# Patient Record
Sex: FEMALE | Race: WHITE | NOT HISPANIC OR LATINO
[De-identification: names, ages, dates, MRNs, and addresses within clinical notes are randomized per-mention and may not be internally consistent; named-entity substitution may affect disease eponyms.]

---

## 2021-09-24 PROBLEM — Z00.00 ENCOUNTER FOR PREVENTIVE HEALTH EXAMINATION: Status: ACTIVE | Noted: 2021-09-24

## 2021-09-27 ENCOUNTER — NON-APPOINTMENT (OUTPATIENT)
Age: 26
End: 2021-09-27

## 2021-09-30 ENCOUNTER — NON-APPOINTMENT (OUTPATIENT)
Age: 26
End: 2021-09-30

## 2021-09-30 ENCOUNTER — APPOINTMENT (OUTPATIENT)
Dept: HEART AND VASCULAR | Facility: CLINIC | Age: 26
End: 2021-09-30
Payer: COMMERCIAL

## 2021-09-30 VITALS
SYSTOLIC BLOOD PRESSURE: 118 MMHG | WEIGHT: 257 LBS | HEIGHT: 64 IN | HEART RATE: 99 BPM | BODY MASS INDEX: 43.87 KG/M2 | DIASTOLIC BLOOD PRESSURE: 70 MMHG

## 2021-09-30 VITALS — SYSTOLIC BLOOD PRESSURE: 118 MMHG | DIASTOLIC BLOOD PRESSURE: 70 MMHG

## 2021-09-30 DIAGNOSIS — E66.9 OBESITY, UNSPECIFIED: ICD-10-CM

## 2021-09-30 DIAGNOSIS — Z82.49 FAMILY HISTORY OF ISCHEMIC HEART DISEASE AND OTHER DISEASES OF THE CIRCULATORY SYSTEM: ICD-10-CM

## 2021-09-30 PROCEDURE — 93306 TTE W/DOPPLER COMPLETE: CPT

## 2021-09-30 PROCEDURE — 93000 ELECTROCARDIOGRAM COMPLETE: CPT

## 2021-09-30 PROCEDURE — 99203 OFFICE O/P NEW LOW 30 MIN: CPT

## 2021-10-03 NOTE — PHYSICAL EXAM
[General Appearance - Well Developed] : well developed [Normal Appearance] : normal appearance [Well Groomed] : well groomed [General Appearance - Well Nourished] : well nourished [No Deformities] : no deformities [General Appearance - In No Acute Distress] : no acute distress [Normal Conjunctiva] : the conjunctiva exhibited no abnormalities [Eyelids - No Xanthelasma] : the eyelids demonstrated no xanthelasmas [Normal Oral Mucosa] : normal oral mucosa [No Oral Pallor] : no oral pallor [No Oral Cyanosis] : no oral cyanosis [Respiration, Rhythm And Depth] : normal respiratory rhythm and effort [Exaggerated Use Of Accessory Muscles For Inspiration] : no accessory muscle use [Auscultation Breath Sounds / Voice Sounds] : lungs were clear to auscultation bilaterally [Heart Rate And Rhythm] : heart rate and rhythm were normal [Heart Sounds] : normal S1 and S2 [Murmurs] : no murmurs present [Abdomen Soft] : soft [Abdomen Tenderness] : non-tender [Abdomen Mass (___ Cm)] : no abdominal mass palpated [Abnormal Walk] : normal gait [Gait - Sufficient For Exercise Testing] : the gait was sufficient for exercise testing [Nail Clubbing] : no clubbing of the fingernails [Cyanosis, Localized] : no localized cyanosis [Petechial Hemorrhages (___cm)] : no petechial hemorrhages [Skin Color & Pigmentation] : normal skin color and pigmentation [] : no rash [No Venous Stasis] : no venous stasis [Skin Lesions] : no skin lesions [No Skin Ulcers] : no skin ulcer [No Xanthoma] : no  xanthoma was observed [FreeTextEntry1] : 15.5 inch neck

## 2021-10-03 NOTE — HISTORY OF PRESENT ILLNESS
[Preoperative Visit] : for a medical evaluation prior to surgery [Scheduled Procedure ___] : a [unfilled] [Surgeon Name ___] : surgeon: [unfilled] [Good] : Good [Electrocardiogram] : ~T an ECG ~C was performed [Echocardiogram] : ~T an echocardiogram ~C was performed [Metabolic Capacity ___Mets%] : The patient has a metabolic capacity of [unfilled] Mets%  [Fever] : no fever [Chills] : no chills [Fatigue] : no fatigue [Chest Pain] : no chest pain [Diabetes] : no diabetes [Cardiovascular Disease] : no cardiovascular disease [Pulmonary Disease] : no pulmonary disease [Prior Anesthesia] : No prior anesthesia [Prev Anesthesia Reaction] : no previous anesthesia reaction [de-identified] : RHEA [FreeTextEntry1] : Patient is a 26-year-old moderately obese female who is currently preop for potential bariatric surgery with a bariatric surgeon.  She denies any prior history of diabetes or hypertension or cardiovascular disease.  Patient does have nocturnal snoring as well as daytime fatigability recent work-up did reveal obstructive sleep apnea and is awaiting the use of a CPAP machine.  No current family history of ischemic heart disease there is a history of hypertension diabetes in her parents.  There is no history of thrombophilia or prior clotting disease

## 2021-10-03 NOTE — ASSESSMENT
[FreeTextEntry1] : Ekg NSR no st chnages \par Echo Lvef 55% no valvular HD \par RV normal no pulm htn

## 2021-10-03 NOTE — DISCUSSION/SUMMARY
[Procedure Intermediate Risk] : the procedure risk is intermediate [Patient Low Risk] : the patient is a low surgical risk [FreeTextEntry1] : ETT pending

## 2021-10-11 ENCOUNTER — APPOINTMENT (OUTPATIENT)
Dept: HEART AND VASCULAR | Facility: CLINIC | Age: 26
End: 2021-10-11
Payer: COMMERCIAL

## 2021-10-11 VITALS
DIASTOLIC BLOOD PRESSURE: 80 MMHG | HEART RATE: 105 BPM | BODY MASS INDEX: 43.87 KG/M2 | HEIGHT: 64 IN | SYSTOLIC BLOOD PRESSURE: 120 MMHG | WEIGHT: 257 LBS

## 2021-10-11 PROCEDURE — 93015 CV STRESS TEST SUPVJ I&R: CPT

## 2022-01-12 ENCOUNTER — APPOINTMENT (OUTPATIENT)
Dept: HEART AND VASCULAR | Facility: CLINIC | Age: 27
End: 2022-01-12
Payer: COMMERCIAL

## 2022-01-12 ENCOUNTER — NON-APPOINTMENT (OUTPATIENT)
Age: 27
End: 2022-01-12

## 2022-01-12 VITALS
HEIGHT: 64 IN | SYSTOLIC BLOOD PRESSURE: 118 MMHG | WEIGHT: 258 LBS | DIASTOLIC BLOOD PRESSURE: 65 MMHG | BODY MASS INDEX: 44.05 KG/M2 | HEART RATE: 95 BPM

## 2022-01-12 DIAGNOSIS — Z99.89 OBSTRUCTIVE SLEEP APNEA (ADULT) (PEDIATRIC): ICD-10-CM

## 2022-01-12 DIAGNOSIS — Z01.810 ENCOUNTER FOR PREPROCEDURAL CARDIOVASCULAR EXAMINATION: ICD-10-CM

## 2022-01-12 DIAGNOSIS — G47.33 OBSTRUCTIVE SLEEP APNEA (ADULT) (PEDIATRIC): ICD-10-CM

## 2022-01-12 PROCEDURE — 99214 OFFICE O/P EST MOD 30 MIN: CPT

## 2022-01-12 PROCEDURE — 93000 ELECTROCARDIOGRAM COMPLETE: CPT | Mod: NC

## 2022-01-12 RX ORDER — OMEPRAZOLE 40 MG/1
40 CAPSULE, DELAYED RELEASE ORAL
Qty: 90 | Refills: 3 | Status: ACTIVE | COMMUNITY
Start: 2022-01-12

## 2022-01-12 NOTE — PHYSICAL EXAM
[General Appearance - Well Developed] : well developed [Normal Appearance] : normal appearance [Well Groomed] : well groomed [General Appearance - Well Nourished] : well nourished [No Deformities] : no deformities [General Appearance - In No Acute Distress] : no acute distress [Normal Conjunctiva] : the conjunctiva exhibited no abnormalities [Eyelids - No Xanthelasma] : the eyelids demonstrated no xanthelasmas [Normal Oral Mucosa] : normal oral mucosa [No Oral Pallor] : no oral pallor [No Oral Cyanosis] : no oral cyanosis [FreeTextEntry1] : 15.5 inch neck  [Respiration, Rhythm And Depth] : normal respiratory rhythm and effort [Exaggerated Use Of Accessory Muscles For Inspiration] : no accessory muscle use [Auscultation Breath Sounds / Voice Sounds] : lungs were clear to auscultation bilaterally [Heart Rate And Rhythm] : heart rate and rhythm were normal [Heart Sounds] : normal S1 and S2 [Murmurs] : no murmurs present [Abdomen Soft] : soft [Abdomen Tenderness] : non-tender [Abdomen Mass (___ Cm)] : no abdominal mass palpated [Abnormal Walk] : normal gait [Gait - Sufficient For Exercise Testing] : the gait was sufficient for exercise testing [Nail Clubbing] : no clubbing of the fingernails [Cyanosis, Localized] : no localized cyanosis [Petechial Hemorrhages (___cm)] : no petechial hemorrhages [Skin Color & Pigmentation] : normal skin color and pigmentation [] : no rash [No Venous Stasis] : no venous stasis [Skin Lesions] : no skin lesions [No Skin Ulcers] : no skin ulcer [No Xanthoma] : no  xanthoma was observed

## 2022-01-12 NOTE — DISCUSSION/SUMMARY
[Procedure Intermediate Risk] : the procedure risk is intermediate [Patient Low Risk] : the patient is a low surgical risk [Optimized for Surgery] : the patient is optimized for surgery [As per surgery] : as per surgery

## 2022-01-12 NOTE — HISTORY OF PRESENT ILLNESS
[Preoperative Visit] : for a medical evaluation prior to surgery [Scheduled Procedure ___] : a [unfilled] [Surgeon Name ___] : surgeon: [unfilled] [Good] : Good [Fever] : no fever [Chills] : no chills [Fatigue] : no fatigue [Chest Pain] : no chest pain [Diabetes] : no diabetes [Cardiovascular Disease] : no cardiovascular disease [Pulmonary Disease] : no pulmonary disease [Prior Anesthesia] : No prior anesthesia [Prev Anesthesia Reaction] : no previous anesthesia reaction [Electrocardiogram] : ~T an ECG ~C was performed [Echocardiogram] : ~T an echocardiogram ~C was performed [Metabolic Capacity ___Mets%] : The patient has a metabolic capacity of [unfilled] Mets%  [de-identified] : RHEA [FreeTextEntry1] : Patient is a 26-year-old moderately obese female who is currently preop for potential bariatric surgery with a bariatric surgeon.  She denies any prior history of diabetes or hypertension or cardiovascular disease.  Patient does have nocturnal snoring as well as daytime fatigability recent work-up did reveal obstructive sleep apnea and is awaiting the use of a CPAP machine.  No current family history of ischemic heart disease there is a history of hypertension diabetes in her parents.  There is no history of thrombophilia or prior clotting disease

## 2022-01-12 NOTE — ASSESSMENT
[FreeTextEntry1] : Ekg NSR no st chnages \par Echo Lvef 55% no valvular HD \par RV normal no pulm htn\par ETT neg for ischemic changes \par \par ******************CLEARED FOR PLANNED BARIATRIC SURGERY AND GENERAL ANAESTHESIA ************

## 2022-04-08 ENCOUNTER — EMERGENCY (EMERGENCY)
Facility: HOSPITAL | Age: 27
LOS: 1 days | Discharge: HOME | End: 2022-04-08
Admitting: EMERGENCY MEDICINE
Payer: COMMERCIAL

## 2022-04-08 VITALS
OXYGEN SATURATION: 99 % | HEART RATE: 91 BPM | RESPIRATION RATE: 18 BRPM | WEIGHT: 229.94 LBS | TEMPERATURE: 99 F | DIASTOLIC BLOOD PRESSURE: 86 MMHG | SYSTOLIC BLOOD PRESSURE: 124 MMHG

## 2022-04-08 VITALS
DIASTOLIC BLOOD PRESSURE: 79 MMHG | OXYGEN SATURATION: 99 % | TEMPERATURE: 98 F | HEART RATE: 80 BPM | SYSTOLIC BLOOD PRESSURE: 130 MMHG | RESPIRATION RATE: 18 BRPM

## 2022-04-08 DIAGNOSIS — Z98.84 BARIATRIC SURGERY STATUS: Chronic | ICD-10-CM

## 2022-04-08 LAB
ALBUMIN SERPL ELPH-MCNC: 4.2 G/DL — SIGNIFICANT CHANGE UP (ref 3.4–5)
ALP SERPL-CCNC: 106 U/L — SIGNIFICANT CHANGE UP (ref 40–120)
ALT FLD-CCNC: 76 U/L — HIGH (ref 12–42)
ANION GAP SERPL CALC-SCNC: 20 MMOL/L — HIGH (ref 9–16)
APPEARANCE UR: CLEAR — SIGNIFICANT CHANGE UP
AST SERPL-CCNC: 48 U/L — HIGH (ref 15–37)
BASOPHILS # BLD AUTO: 0.08 K/UL — SIGNIFICANT CHANGE UP (ref 0–0.2)
BASOPHILS NFR BLD AUTO: 0.7 % — SIGNIFICANT CHANGE UP (ref 0–2)
BILIRUB SERPL-MCNC: 1.2 MG/DL — SIGNIFICANT CHANGE UP (ref 0.2–1.2)
BILIRUB UR-MCNC: ABNORMAL
BUN SERPL-MCNC: 11 MG/DL — SIGNIFICANT CHANGE UP (ref 7–23)
CALCIUM SERPL-MCNC: 10.2 MG/DL — SIGNIFICANT CHANGE UP (ref 8.5–10.5)
CHLORIDE SERPL-SCNC: 100 MMOL/L — SIGNIFICANT CHANGE UP (ref 96–108)
CO2 SERPL-SCNC: 17 MMOL/L — LOW (ref 22–31)
COLOR SPEC: YELLOW — SIGNIFICANT CHANGE UP
CREAT SERPL-MCNC: 0.84 MG/DL — SIGNIFICANT CHANGE UP (ref 0.5–1.3)
DIFF PNL FLD: ABNORMAL
EGFR: 97 ML/MIN/1.73M2 — SIGNIFICANT CHANGE UP
EOSINOPHIL # BLD AUTO: 0.11 K/UL — SIGNIFICANT CHANGE UP (ref 0–0.5)
EOSINOPHIL NFR BLD AUTO: 1 % — SIGNIFICANT CHANGE UP (ref 0–6)
GLUCOSE SERPL-MCNC: 103 MG/DL — HIGH (ref 70–99)
GLUCOSE UR QL: NEGATIVE — SIGNIFICANT CHANGE UP
HCG SERPL-ACNC: <1 MIU/ML — SIGNIFICANT CHANGE UP
HCT VFR BLD CALC: 41.1 % — SIGNIFICANT CHANGE UP (ref 34.5–45)
HGB BLD-MCNC: 13.4 G/DL — SIGNIFICANT CHANGE UP (ref 11.5–15.5)
IMM GRANULOCYTES NFR BLD AUTO: 0.2 % — SIGNIFICANT CHANGE UP (ref 0–1.5)
KETONES UR-MCNC: >=80 MG/DL
LEUKOCYTE ESTERASE UR-ACNC: NEGATIVE — SIGNIFICANT CHANGE UP
LIDOCAIN IGE QN: 403 U/L — HIGH (ref 73–393)
LYMPHOCYTES # BLD AUTO: 2.87 K/UL — SIGNIFICANT CHANGE UP (ref 1–3.3)
LYMPHOCYTES # BLD AUTO: 25.5 % — SIGNIFICANT CHANGE UP (ref 13–44)
MCHC RBC-ENTMCNC: 24.5 PG — LOW (ref 27–34)
MCHC RBC-ENTMCNC: 32.6 GM/DL — SIGNIFICANT CHANGE UP (ref 32–36)
MCV RBC AUTO: 75 FL — LOW (ref 80–100)
MONOCYTES # BLD AUTO: 0.68 K/UL — SIGNIFICANT CHANGE UP (ref 0–0.9)
MONOCYTES NFR BLD AUTO: 6 % — SIGNIFICANT CHANGE UP (ref 2–14)
NEUTROPHILS # BLD AUTO: 7.51 K/UL — HIGH (ref 1.8–7.4)
NEUTROPHILS NFR BLD AUTO: 66.6 % — SIGNIFICANT CHANGE UP (ref 43–77)
NITRITE UR-MCNC: NEGATIVE — SIGNIFICANT CHANGE UP
NRBC # BLD: 0 /100 WBCS — SIGNIFICANT CHANGE UP (ref 0–0)
PH UR: 5.5 — SIGNIFICANT CHANGE UP (ref 5–8)
PLATELET # BLD AUTO: 354 K/UL — SIGNIFICANT CHANGE UP (ref 150–400)
POTASSIUM SERPL-MCNC: 3.9 MMOL/L — SIGNIFICANT CHANGE UP (ref 3.5–5.3)
POTASSIUM SERPL-SCNC: 3.9 MMOL/L — SIGNIFICANT CHANGE UP (ref 3.5–5.3)
PROT SERPL-MCNC: 8.7 G/DL — HIGH (ref 6.4–8.2)
PROT UR-MCNC: 30 MG/DL
RBC # BLD: 5.48 M/UL — HIGH (ref 3.8–5.2)
RBC # FLD: 17.7 % — HIGH (ref 10.3–14.5)
SARS-COV-2 RNA SPEC QL NAA+PROBE: SIGNIFICANT CHANGE UP
SODIUM SERPL-SCNC: 137 MMOL/L — SIGNIFICANT CHANGE UP (ref 132–145)
SP GR SPEC: >=1.03 — SIGNIFICANT CHANGE UP (ref 1–1.03)
UROBILINOGEN FLD QL: 0.2 E.U./DL — SIGNIFICANT CHANGE UP
WBC # BLD: 11.27 K/UL — HIGH (ref 3.8–10.5)
WBC # FLD AUTO: 11.27 K/UL — HIGH (ref 3.8–10.5)

## 2022-04-08 PROCEDURE — 74177 CT ABD & PELVIS W/CONTRAST: CPT | Mod: 26

## 2022-04-08 PROCEDURE — 99285 EMERGENCY DEPT VISIT HI MDM: CPT

## 2022-04-08 RX ORDER — HYDROMORPHONE HYDROCHLORIDE 2 MG/ML
0.5 INJECTION INTRAMUSCULAR; INTRAVENOUS; SUBCUTANEOUS ONCE
Refills: 0 | Status: DISCONTINUED | OUTPATIENT
Start: 2022-04-08 | End: 2022-04-08

## 2022-04-08 RX ORDER — ONDANSETRON 8 MG/1
4 TABLET, FILM COATED ORAL ONCE
Refills: 0 | Status: COMPLETED | OUTPATIENT
Start: 2022-04-08 | End: 2022-04-08

## 2022-04-08 RX ORDER — KETOROLAC TROMETHAMINE 30 MG/ML
30 SYRINGE (ML) INJECTION ONCE
Refills: 0 | Status: DISCONTINUED | OUTPATIENT
Start: 2022-04-08 | End: 2022-04-08

## 2022-04-08 RX ORDER — SODIUM CHLORIDE 9 MG/ML
1000 INJECTION INTRAMUSCULAR; INTRAVENOUS; SUBCUTANEOUS ONCE
Refills: 0 | Status: COMPLETED | OUTPATIENT
Start: 2022-04-08 | End: 2022-04-08

## 2022-04-08 RX ORDER — OXYCODONE AND ACETAMINOPHEN 5; 325 MG/1; MG/1
1 TABLET ORAL ONCE
Refills: 0 | Status: DISCONTINUED | OUTPATIENT
Start: 2022-04-08 | End: 2022-04-08

## 2022-04-08 RX ORDER — METOCLOPRAMIDE HCL 10 MG
10 TABLET ORAL ONCE
Refills: 0 | Status: COMPLETED | OUTPATIENT
Start: 2022-04-08 | End: 2022-04-08

## 2022-04-08 RX ORDER — IOHEXOL 300 MG/ML
30 INJECTION, SOLUTION INTRAVENOUS ONCE
Refills: 0 | Status: COMPLETED | OUTPATIENT
Start: 2022-04-08 | End: 2022-04-08

## 2022-04-08 RX ORDER — HYDROMORPHONE HYDROCHLORIDE 2 MG/ML
1 INJECTION INTRAMUSCULAR; INTRAVENOUS; SUBCUTANEOUS ONCE
Refills: 0 | Status: DISCONTINUED | OUTPATIENT
Start: 2022-04-08 | End: 2022-04-08

## 2022-04-08 RX ORDER — TAMSULOSIN HYDROCHLORIDE 0.4 MG/1
0.4 CAPSULE ORAL AT BEDTIME
Refills: 0 | Status: DISCONTINUED | OUTPATIENT
Start: 2022-04-08 | End: 2022-04-12

## 2022-04-08 RX ORDER — TAMSULOSIN HYDROCHLORIDE 0.4 MG/1
1 CAPSULE ORAL
Qty: 7 | Refills: 0
Start: 2022-04-08 | End: 2022-04-14

## 2022-04-08 RX ADMIN — ONDANSETRON 4 MILLIGRAM(S): 8 TABLET, FILM COATED ORAL at 19:21

## 2022-04-08 RX ADMIN — OXYCODONE AND ACETAMINOPHEN 1 TABLET(S): 5; 325 TABLET ORAL at 19:21

## 2022-04-08 RX ADMIN — TAMSULOSIN HYDROCHLORIDE 0.4 MILLIGRAM(S): 0.4 CAPSULE ORAL at 18:16

## 2022-04-08 RX ADMIN — Medication 10 MILLIGRAM(S): at 17:14

## 2022-04-08 RX ADMIN — HYDROMORPHONE HYDROCHLORIDE 0.5 MILLIGRAM(S): 2 INJECTION INTRAMUSCULAR; INTRAVENOUS; SUBCUTANEOUS at 17:12

## 2022-04-08 RX ADMIN — Medication 30 MILLIGRAM(S): at 18:16

## 2022-04-08 RX ADMIN — IOHEXOL 30 MILLILITER(S): 300 INJECTION, SOLUTION INTRAVENOUS at 14:26

## 2022-04-08 RX ADMIN — HYDROMORPHONE HYDROCHLORIDE 1 MILLIGRAM(S): 2 INJECTION INTRAMUSCULAR; INTRAVENOUS; SUBCUTANEOUS at 14:26

## 2022-04-08 RX ADMIN — SODIUM CHLORIDE 1000 MILLILITER(S): 9 INJECTION INTRAMUSCULAR; INTRAVENOUS; SUBCUTANEOUS at 18:16

## 2022-04-08 RX ADMIN — ONDANSETRON 4 MILLIGRAM(S): 8 TABLET, FILM COATED ORAL at 17:12

## 2022-04-08 NOTE — ED PROVIDER NOTE - NSFOLLOWUPINSTRUCTIONS_ED_ALL_ED_FT
Please take naproxen as instructed with food    use urine strainer if you catch the stone please take it to your urologist    Take percocet for increased pain. Drink plenty of fluids    take flomax as instructed, please follow up with urology within 3 days    Share all your cat scan reports with your primary care doctor and with your surgeon follow up within three days    Please return to the ed for worsening pain, or any other alarming symptoms like fever over 100,4 , persistant vomiting or other concerns         Renal Colic       Renal colic is pain that is caused by a kidney stone. The pain can be sharp and very bad. It may be felt in the back, belly, side (flank), or groin. It can cause nausea. Renal colic can come and go.      Follow these instructions at home:    Medicines     •Take over-the-counter and prescription medicines only as told by your doctor.      • Do not drive or use heavy machinery while taking prescription pain medicine.        Eating and drinking      •Drink enough fluid to keep your pee (urine) pale yellow. You may be told to drink at least 8–10 glasses of water each day. Follow instructions from your doctor.    •If told, change your diet. This may include eating:  •Less salt (sodium). Eat less than 2 grams (2,000 mg) of salt per day.      •Less meat, poultry, fish, and eggs.      •More fruits and vegetables.      •Try not to eat spinach, rhubarb, sweet potatoes, or nuts.        •Follow instructions from your doctor about what foods and drinks to avoid.      General instructions     •Keep all follow-up visits as told by your doctor. This is important.      •Collect pee samples as told by your doctor.      •Strain your pee every time you pee, as told by your doctor. Use the strainer that your doctor recommends.      • Do not throw out the kidney stone after passing it. Keep the stone so it can be tested by your doctor.        Contact a doctor if:    •You have a fever or chills.      •Your pee smells bad or looks cloudy.      •You have pain or burning when you pee.        Get help right away if:    •The pain in your side (flank) or your groin suddenly gets worse.      •You get confused.      •You pass out.        Summary    •Renal colic is pain that is caused by a kidney stone.      •Take over-the-counter and prescription medicines only as told by your doctor.      •Drink enough fluid to keep your pee pale yellow. You may be told to drink at least 8–10 glasses of water each day. Follow instructions from your doctor.      •Strain your pee every time you pee, as told by your doctor. Use the strainer that your doctor recommends.      • Do not throw out the kidney stone after passing it. Keep the stone so it can be tested by your doctor.      This information is not intended to replace advice given to you by your health care provider. Make sure you discuss any questions you have with your health care provider.            Splenic Infarction    WHAT YOU NEED TO KNOW:    A splenic infarction is a blockage that prevents blood from flowing to your spleen. Your spleen is in your left upper abdomen, just below your ribs. Your spleen is part of your lymph system and helps fight infection. It also helps control the number of blood cells that flow through your body.  Abdominal Organs         DISCHARGE INSTRUCTIONS:    Call your local emergency number (911 in the ) if:   •You have severe pain or tenderness in your upper left abdomen.      •You are confused or start to feel lightheaded, dizzy, or faint.      •You have pain in your left shoulder.      Return to the emergency department if:   •You have trouble having a bowel movement or urinating.      •You have new or worsening symptoms.      Call your doctor if:   •You have a fever.      •You have nausea or are vomiting.      •You have questions or concerns about your condition or care.      Medicines: You may need any of the following:  •Prescription pain medicine may be given. Ask your healthcare provider how to take this medicine safely. Some prescription pain medicines contain acetaminophen. Do not take other medicines that contain acetaminophen without talking to your healthcare provider. Too much acetaminophen may cause liver damage. Prescription pain medicine may cause constipation. Ask your healthcare provider how to prevent or treat constipation.       •Antibiotics help prevent or treat a bacterial infection.      •Medicines may be given to prevent or treat nausea or vomiting. You may need other medicines if you have sickle cell disease. Your healthcare provider will give you more information on sickle cell medicines.      •Take your medicine as directed. Contact your healthcare provider if you think your medicine is not helping or if you have side effects. Tell him or her if you are allergic to any medicine. Keep a list of the medicines, vitamins, and herbs you take. Include the amounts, and when and why you take them. Bring the list or the pill bottles to follow-up visits. Carry your medicine list with you in case of an emergency.      Manage a splenic infarction:   •Ask about vaccines you may need. Vaccines help prevent infection and illness, such as the flu, COVID-19, and pneumonia. If you have surgery to remove your spleen, your risk for infections will always be high. Your healthcare providers will give you information about vaccines to get and when to get them.  Immunization Schedule 2021           •Do not take aspirin or NSAIDs. These medicines may increase your risk for bleeding.      •Limit activity as directed by your healthcare provider. He or she may tell you not to play contact sports or do heavy physical activities. Ask how long to limit activity, if needed.      •Eat healthy foods. Healthy foods can help improve your energy and overall health. Healthy foods include fruits, vegetables, whole-grain breads, low-fat dairy products, beans, lean meats, and fish. Ask if you need to be on a special diet.  Healthy Foods           Follow up with your doctor as directed: Write down your questions so You were found to have a kidney stone, splenomegaly and splenic infarct , you must avoid contact sports , please share all this with your doctor       Please take naproxen as instructed with food    use urine strainer if you catch the stone please take it to your urologist    Take percocet for increased pain. Drink plenty of fluids    take flomax as instructed, please follow up with urology within 3 days    Share all your cat scan reports with your primary care doctor and with your surgeon follow up within three days  your liver function tests were also elevated please share all results with your doctor they are all attached below     Please return to the ed for worsening pain, or any other alarming symptoms like fever over 100,4 , persistant vomiting or other concerns         Renal Colic       Renal colic is pain that is caused by a kidney stone. The pain can be sharp and very bad. It may be felt in the back, belly, side (flank), or groin. It can cause nausea. Renal colic can come and go.      Follow these instructions at home:    Medicines     •Take over-the-counter and prescription medicines only as told by your doctor.      • Do not drive or use heavy machinery while taking prescription pain medicine.        Eating and drinking      •Drink enough fluid to keep your pee (urine) pale yellow. You may be told to drink at least 8–10 glasses of water each day. Follow instructions from your doctor.    •If told, change your diet. This may include eating:  •Less salt (sodium). Eat less than 2 grams (2,000 mg) of salt per day.      •Less meat, poultry, fish, and eggs.      •More fruits and vegetables.      •Try not to eat spinach, rhubarb, sweet potatoes, or nuts.        •Follow instructions from your doctor about what foods and drinks to avoid.      General instructions     •Keep all follow-up visits as told by your doctor. This is important.      •Collect pee samples as told by your doctor.      •Strain your pee every time you pee, as told by your doctor. Use the strainer that your doctor recommends.      • Do not throw out the kidney stone after passing it. Keep the stone so it can be tested by your doctor.        Contact a doctor if:    •You have a fever or chills.      •Your pee smells bad or looks cloudy.      •You have pain or burning when you pee.        Get help right away if:    •The pain in your side (flank) or your groin suddenly gets worse.      •You get confused.      •You pass out.        Summary    •Renal colic is pain that is caused by a kidney stone.      •Take over-the-counter and prescription medicines only as told by your doctor.      •Drink enough fluid to keep your pee pale yellow. You may be told to drink at least 8–10 glasses of water each day. Follow instructions from your doctor.      •Strain your pee every time you pee, as told by your doctor. Use the strainer that your doctor recommends.      • Do not throw out the kidney stone after passing it. Keep the stone so it can be tested by your doctor.      This information is not intended to replace advice given to you by your health care provider. Make sure you discuss any questions you have with your health care provider.            Splenic Infarction    WHAT YOU NEED TO KNOW:    A splenic infarction is a blockage that prevents blood from flowing to your spleen. Your spleen is in your left upper abdomen, just below your ribs. Your spleen is part of your lymph system and helps fight infection. It also helps control the number of blood cells that flow through your body.  Abdominal Organs         DISCHARGE INSTRUCTIONS:    Call your local emergency number (911 in the [place]) if:   •You have severe pain or tenderness in your upper left abdomen.      •You are confused or start to feel lightheaded, dizzy, or faint.      •You have pain in your left shoulder.      Return to the emergency department if:   •You have trouble having a bowel movement or urinating.      •You have new or worsening symptoms.      Call your doctor if:   •You have a fever.      •You have nausea or are vomiting.      •You have questions or concerns about your condition or care.      Medicines: You may need any of the following:  •Prescription pain medicine may be given. Ask your healthcare provider how to take this medicine safely. Some prescription pain medicines contain acetaminophen. Do not take other medicines that contain acetaminophen without talking to your healthcare provider. Too much acetaminophen may cause liver damage. Prescription pain medicine may cause constipation. Ask your healthcare provider how to prevent or treat constipation.       •Antibiotics help prevent or treat a bacterial infection.      •Medicines may be given to prevent or treat nausea or vomiting. You may need other medicines if you have sickle cell disease. Your healthcare provider will give you more information on sickle cell medicines.      •Take your medicine as directed. Contact your healthcare provider if you think your medicine is not helping or if you have side effects. Tell him or her if you are allergic to any medicine. Keep a list of the medicines, vitamins, and herbs you take. Include the amounts, and when and why you take them. Bring the list or the pill bottles to follow-up visits. Carry your medicine list with you in case of an emergency.      Manage a splenic infarction:   •Ask about vaccines you may need. Vaccines help prevent infection and illness, such as the flu, COVID-19, and pneumonia. If you have surgery to remove your spleen, your risk for infections will always be high. Your healthcare providers will give you information about vaccines to get and when to get them.  Immunization Schedule 2021           •Do not take aspirin or NSAIDs. These medicines may increase your risk for bleeding.      •Limit activity as directed by your healthcare provider. He or she may tell you not to play contact sports or do heavy physical activities. Ask how long to limit activity, if needed.      •Eat healthy foods. Healthy foods can help improve your energy and overall health. Healthy foods include fruits, vegetables, whole-grain breads, low-fat dairy products, beans, lean meats, and fish. Ask if you need to be on a special diet.  Healthy Foods           Follow up with your doctor as directed: Write down your questions so

## 2022-04-08 NOTE — ED ADULT TRIAGE NOTE - CHIEF COMPLAINT QUOTE
here for lower abdominal pain and constipation, states she gave a urine sample and some blood was noted- pt with recent gastric bypass surgery 3/15

## 2022-04-08 NOTE — ED PROVIDER NOTE - CHPI ED SYMPTOMS NEG
no vaginal discharge, no chest pain, no shortness of breath/no fever/no nausea/no vomiting/no chills

## 2022-04-08 NOTE — ED PROVIDER NOTE - PATIENT PORTAL LINK FT
You can access the FollowMyHealth Patient Portal offered by [facility] by registering at the following website: [email or web address]. By joining [employer] [facility]’s FollowMyHealth portal, you will also be able to view your health information using other applications (apps) compatible with our system.

## 2022-04-08 NOTE — ED PROVIDER NOTE - PROGRESS NOTE DETAILS
Pt had one episode of vomiting in the ed, now all symptoms improved, vitals normal, pt informed of all reports given copies of all results, dc home in stable condition

## 2022-04-08 NOTE — ED PROVIDER NOTE - OBJECTIVE STATEMENT
28 y/o female with PSHx of recent gastric bypass surgery (3/15/22), presents to the ED for 1 day of left sided flank pain and constipation. Patient states her last bowel movement was yesterday. Denies nausea, vomiting, fever, chills, vaginal discharge, chest pain, or shortness of breath. Patient unable to give detailed answers secondary to pain.

## 2022-04-08 NOTE — ED ADULT NURSE NOTE - OBJECTIVE STATEMENT
patient screaming in room about periumbilical/LLQ abd pain that 'started getting worse in last 10 min"; screaming and cryign in room; iv placed and labs drawn; given IV pain meds as per MD order; felt dizzy and weak after Dilaudid administration; iv fluids given; side rails put upright; VSS; feelign less pain and nausea after meds given

## 2022-04-12 DIAGNOSIS — Z20.822 CONTACT WITH AND (SUSPECTED) EXPOSURE TO COVID-19: ICD-10-CM

## 2022-04-12 DIAGNOSIS — Z98.0 INTESTINAL BYPASS AND ANASTOMOSIS STATUS: ICD-10-CM

## 2022-04-12 DIAGNOSIS — N20.0 CALCULUS OF KIDNEY: ICD-10-CM

## 2022-04-12 DIAGNOSIS — K59.00 CONSTIPATION, UNSPECIFIED: ICD-10-CM

## 2022-04-12 DIAGNOSIS — R10.9 UNSPECIFIED ABDOMINAL PAIN: ICD-10-CM

## 2022-08-05 NOTE — ED PROVIDER NOTE - CLINICAL SUMMARY MEDICAL DECISION MAKING FREE TEXT BOX
"Chief Complaint   Patient presents with     Follow Up     Exam and Labs     /72   Pulse 87   Temp 98.9  F (37.2  C) (Oral)   Resp 18   Ht 1.832 m (6' 0.13\")   Wt 79.9 kg (176 lb 2.4 oz)   SpO2 97%   BMI 23.81 kg/m      No Pain (0)  Data Unavailable    I have reviewed the patients medications and allergies    Height/weight double check needed? No    Peds Outpatient BP  1) Rested for 5 minutes, BP taken on bare arm, patient sitting (or supine for infants) w/ legs uncrossed?   Yes  2) Right arm used?      Yes  3) Arm circumference of largest part of upper arm (in cm):    4) BP cuff sized used: Adult (25-32cm)   If used different size cuff then what was recommended why? N/A  5) First BP reading:machine   BP Readings from Last 1 Encounters:   22 134/72      Is reading >90%?Yes   (90% for <1 years is 90/50)  (90% for >18 years is 140/90)  *If a machine BP is at or above 90% take manual BP  6) Manual BP readin/62  7) Other comments: None          Sandrita Ruiz, AGNES  2022  " 26 y/o female presents with left flank pain, s/p recent gastric bypass. Will control pain, check labs and get CT A/P. 28 y/o female presents with left flank pain, s/p recent gastric bypass. Will control pain, check labs and get CT A/P.    labs wnl, large blood in ua     Pt found to have a mildly enlarged spleen and possible spleenic infarct, advised to follow up with doctor asap and avoid contact sports and physical strain  no abscess on ct scan , pt afebrile, vitals stable     Also found to have a 3 mm kidney stone , given flomax, pain controlled in the ed , urology follow up    pt discharged home in stable condition given copies of all reports, informed she must follow up 26 y/o female presents with left flank pain, s/p recent gastric bypass. Will control pain, check labs and get CT A/P.    labs wnl, large blood in ua     Pt found to have a mildly enlarged spleen and possible spleenic infarct, advised to follow up with doctor asap and avoid contact sports and physical strain  no abscess on ct scan , pt afebrile, vitals stable     as per up to date guidelines no interventions besides pain control and monitoring for splenic infarct, if no abscess or other pathology,     Also found to have a 3 mm kidney stone , given flomax, pain controlled in the ed , urology follow up    pt discharged home in stable condition given copies of all reports, informed she must follow up all labs including elevated lft with pmd and instructed to return for worsening symptoms 28 y/o female presents with left flank pain, s/p recent gastric bypass. Will control pain, check labs and get CT A/P.    labs wnl, large blood in ua     Pt found to have a mildly enlarged spleen and possible spleenic infarct, advised to follow up with doctor asap and avoid contact sports and physical strain  no abscess on ct scan , pt afebrile, vitals stable     as per up to date guidelines for splenic infarcts  no interventions besides pain control and monitoring if no abscess or other pathology,     Also found to have a 3 mm kidney stone , given flomax, pain controlled in the ed , urology follow up    pt discharged home in stable condition given copies of all reports, informed she must follow up all labs including elevated lft with pmd and instructed to return for worsening symptoms
